# Patient Record
Sex: MALE | Race: WHITE
[De-identification: names, ages, dates, MRNs, and addresses within clinical notes are randomized per-mention and may not be internally consistent; named-entity substitution may affect disease eponyms.]

---

## 2018-05-26 ENCOUNTER — HOSPITAL ENCOUNTER (EMERGENCY)
Dept: HOSPITAL 80 - FED | Age: 53
Discharge: HOME | End: 2018-05-26
Payer: COMMERCIAL

## 2018-05-26 VITALS — SYSTOLIC BLOOD PRESSURE: 129 MMHG | DIASTOLIC BLOOD PRESSURE: 91 MMHG
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
## 2018-05-26 DIAGNOSIS — S29.011A: Primary | ICD-10-CM

## 2018-05-26 DIAGNOSIS — R09.1: ICD-10-CM

## 2018-05-26 DIAGNOSIS — E86.9: ICD-10-CM
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
## 2018-05-26 LAB
CK SERPL-CCNC: 118 IU/L (ref 0–224)
INR PPP: 0.97 (ref 0.83–1.16)
PLATELET # BLD: 198 10^3/UL (ref 150–400)
PROTHROMBIN TIME: 13.1 SEC (ref 12–15)

## 2018-05-26 NOTE — CPEKG
Heart Rate: 82

RR Interval: 732

P-R Interval: 188

QRSD Interval: 104

QT Interval: 388

QTC Interval: 453

P Axis: 27

QRS Axis: 56

T Wave Axis: 6

EKG Severity - NORMAL ECG -

EKG Impression: SINUS RHYTHM

Electronically Signed By: Aide Estrada 26-May-2018 15:02:17

## 2018-05-26 NOTE — EDPHY
H & P


Stated Complaint: "felt vibration in heart"


Source: Patient, EMS





- Personal History


Current Tetanus/Diphtheria Vaccine: Unsure


Current Tetanus Diphtheria and Acellular Pertussis (TDAP): Unsure





- Medical/Surgical History


Hx Asthma: No


Hx Chronic Respiratory Disease: No


Hx Diabetes: No


Hx Cardiac Disease: No


Hx Renal Disease: No


Hx Cirrhosis: No


Hx Alcoholism: No


Hx HIV/AIDS: No


Hx Splenectomy or Spleen Trauma: No


Other PMH: kidney stone





- Social History


Smoking Status: Never smoked


Time Seen by Provider: 05/26/18 03:47


HPI/ROS: 





HPI





CHIEF COMPLAINT:  Vibrating and chest and chest pressure





HISTORY OF PRESENT ILLNESS:  This patient very pleasant 52-year-old male he is 

otherwise healthy has a history of kidney stones he presents emergency room by 

EMS after he states that he was "dreaming and felt vibration in his chest"  

patient states that he initially thought it was a dream and then he woke up and 

felt vibrating in his chest.  He asked his wife to check out his chest and 

listen for the vibrating his wife put her ear to his chest and heard vibration.

  This concerned them he states that he developed some pressure in his chest 

especially when he goes to lay down.  When he lays flat he feels like he cannot 

take a deep breath in.


This started at 2:00 a.m. It is now close to 4:00 a.m. In the emergency room.  

He called 911 after talking to a nurse hotline for discomfort in his chest.


He reports that he cleaned out his garage today moved Dilaudid boxes and was 

sweeping out dirt and debris from his garage.  Does complain of shortness of 

breath when he goes to lay flat.  He denies any pleuritic pain.





He became concerned with the sensation of something vibrating in his chest.  

His wife heard the vibration as well and this prompted a 911 call.





Upon arrival to the emergency room the patient is resting comfortably he denies 

any further vibrating in his chest.  He does report when he goes to lay flat 

here in the ER that he gets short of breath can't take a deep breath in.





Patient was given full-dose aspirin by EMS.





He denies any hemoptysis or pleuritic pain.  Denies fever.  Denies productive 

cough.  Denies focal numbness or tingling.








Past Medical History:  Denies medical history except for kidney stones





Past Surgical History:  Denies significant surgical history





Social History:  Denies daily use of drugs alcohol tobacco.





Family History:  Denies any cardiac disease in his family








ROS   


REVIEW OF SYSTEMS:


A comprehensive 10 point review of systems is otherwise negative aside from 

elements mentioned in the history of present illness.








Exam   


Constitutional  appears well nontoxic no acute distress, vital signs stable at 

triage, triage nursing summary reviewed, vital signs reviewed, awake/alert. 


Eyes   normal conjunctivae and sclera, EOMI, PERRLA. 


HENT   normal inspection, atraumatic, moist mucus membranes, no epistaxis, neck 

supple/ no meningismus, no raccoon eyes. 


Respiratory   clear to auscultation bilaterally, normal breath sounds, no 

respiratory distress, no wheezing. 


Cardiovascular   rate normal, regular rhythm, no murmur, no edema, distal 

pulses normal. 


Gastrointestinal   soft, non-tender, no rebound, no guarding, normal bowel 

sounds, no distension, no pulsatile mass. 


Genitourinary   no CVA tenderness. 


Musculoskeletal  no midline vertebral tenderness, full range of motion, no calf 

swelling, no tenderness of extremities, no meningismus, good pulses, 

neurovascularly intact.


Skin   pink, warm, & dry, no rash, skin atraumatic. 


Neurologic   awake, alert and oriented x 3, AAOx3, moves all 4 extremities 

equally, motor intact, sensory intact, CN II-XII intact, normal cerebellar, 

normal vision, normal speech. 


Psychiatric   normal mood/affect. 


Heme/Lymph/Immune   no lymphadenopathy.





Differential diagnosis includes but is not limited to:  ACS, atypical chest pain

, pneumothorax, pneumonia, pulmonary embolism, aortic dissection, congestive 

heart failure, tumor, musculoskeletal pain, esophageal pain, GERD, peptic ulcer 

disease, pancreatitis





Medical Decision Making:  Plan for this patient IV establishment with EKG, 

check troponin, full cardiac monitor, chest x-ray two view, D-dimer, gentle IV 

hydration and re-evaluate.





Re-evaluation:








EKG interpretation by me on record in [facility] system.  Impression time of 

EKG 3:40 a.m., this is sinus rhythm rate of 82 there is no acute ischemia no ST 

elevation no ST depression no significant T-wave abnormalities.  No signs of 

cardiac arrhythmia.  Unremarkable EKG.








0618:  Further discussion with the patient he states when he breathes in with 

deep inspiration he has some chest discomfort.  Describes as sharp across his 

chest.  It is reproducible every time he takes deep breath in or goes to lay 

flat.





His blood work has been reassuring and reviewed.  He has a negative troponin 

nonischemic EKG.








After further discussion with the patient he reports that he hung the tire rack 

in his garage for the past 2 days worked on applying himself yesterday caring 

multiple 40 lb tires and placing them on the rack above his garage.  He had to 

climb a ladder to do this.  He additionally states that he placed spare tires 

as well.  This was a lot of vigorous activity with him.  He did not feel any 

chest pain or shortness at that time.  However tonight he woke up with this 

discomfort.  When I palpate his chest he does not have any significant 

tenderness or crepitus.





However given that his troponin is normal on EKG is nonischemic negative D-dimer

, chest x-ray is unremarkable without any acute cardiopulmonary disease.





He still having pleuritic pain reproducible over time takes deep breath in.  We 

discussed risk versus benefit about CT angiogram of his chest.  He would like 

to do the CT angiogram of chest to make sure there is not any evidence of 

significant chest wall injury inflammation or PE.





Additionally I will order him IV Toradol to see if this improves his discomfort.





If the Toradol improved his pain in the CT scan is negative I feel like he can 

go home.


I will plan on repeat EKG repeat troponin at 7:00 a.m..  If these are normal 

and he feels better I believe he can go home.








Most likely cause of chest pain is musculoskeletal nature.





CT angiogram of the chest for rule out pulmonary embolism are normal thorax or 

focal pneumonia is negative.  No PE no pneumonia no pneumothorax.  Called to me 

by Dr. Leon.





0653:  Patient resting comfortably.  Dr. Aide Estrada follow-up repeat troponin.





If repeat troponin is negative patient be discharged home.





This is unlikely to be acute coronary syndrome with 2-troponins and 2 normal 

EKGs.





CT angiogram of the chest shows no PE.





Most likely this is musculoskeletal chest wall pain from extensive use of 

moving tires.





Return precautions discussed with the patient.





EKG interpretation by me on record in [facility] system.  Impression this is a 

repeat EKG time of repeat EKG 6:50 a.m. This is sinus rhythm rate of 71 this is 

unchanged from the previous EKG with no acute ischemia.  No ST elevation no ST 

depression.  Very similar morphology to the previous EKG.





Repeat troponin is pending.  Dr. Aide Estrada follow up repeat troponin and 

reassess patient. (Rubio Wills)


Constitutional: 


 Initial Vital Signs











Temperature (C)  36.7 C   05/26/18 03:37


 


Heart Rate  77   05/26/18 03:37


 


Respiratory Rate  16   05/26/18 03:37


 


Blood Pressure  138/102 H  05/26/18 03:37


 


O2 Sat (%)  92   05/26/18 03:37








 











O2 Delivery Mode               Room Air


 


O2 (L/minute)                  2














Allergies/Adverse Reactions: 


 





No Known Allergies Allergy (Unverified 05/26/18 04:18)


 








Home Medications: 














 Medication  Instructions  Recorded


 


Ibuprofen [Motrin (*)] 800 mg PO Q6-8PRN #10 tab 05/26/18














Medical Decision Making


ED Course/Re-evaluation: 





Repeat troponin is normal.  I reviewed the EKGs and both EKGs are normal.  

Heart Score is 1 (for age).  HPI reviewed with pt; suggests musculoskeletal 

etiology.  I feel this patient is a safe and stable for discharge home.  He 

will follow up with Cardiology as an outpatient.  Chest pain precautions given.


 (Aide Estrada)


Differential Diagnosis: 





Differential diagnosis includes though it is not limited to pneumonia, 

pneumothorax, pulmonary embolism, aortic dissection, pericarditis, acute 

coronary syndrome. (Aide Estrada)





- Data Points


Laboratory Results: 


 Laboratory Results





 05/26/18 03:45 





 05/26/18 03:45 





 











  05/26/18 05/26/18 05/26/18





  06:58 03:45 03:45


 


WBC      





    


 


RBC      





    


 


Hgb      





    


 


Hct      





    


 


MCV      





    


 


MCH      





    


 


MCHC      





    


 


RDW      





    


 


Plt Count      





    


 


MPV      





    


 


Neut % (Auto)      





    


 


Lymph % (Auto)      





    


 


Mono % (Auto)      





    


 


Eos % (Auto)      





    


 


Baso % (Auto)      





    


 


Nucleat RBC Rel Count      





    


 


Absolute Neuts (auto)      





    


 


Absolute Lymphs (auto)      





    


 


Absolute Monos (auto)      





    


 


Absolute Eos (auto)      





    


 


Absolute Basos (auto)      





    


 


Absolute Nucleated RBC      





    


 


Immature Gran %      





    


 


Immature Gran #      





    


 


PT      13.1 SEC SEC





     (12.0-15.0) 


 


INR      0.97 





     (0.83-1.16) 


 


APTT      30.7 SEC SEC





     (23.0-38.0) 


 


D-Dimer      < 0.27 ug/mLFEU ug/mLFEU





     (0.00-0.50) 


 


Sodium    141 mEq/L mEq/L  





    (135-145)  


 


Potassium    3.8 mEq/L mEq/L  





    (3.3-5.0)  


 


Chloride    96 mEq/L L mEq/L  





    ()  


 


Carbon Dioxide    31 mEq/l mEq/l  





    (22-31)  


 


Anion Gap    14 mEq/L mEq/L  





    (8-16)  


 


BUN    18 mg/dL mg/dL  





    (7-23)  


 


Creatinine    1.1 mg/dL mg/dL  





    (0.7-1.3)  


 


Estimated GFR    > 60   





    


 


Glucose    101 mg/dL H mg/dL  





    ()  


 


Calcium    9.1 mg/dL mg/dL  





    (8.5-10.4)  


 


Magnesium    2.0 mg/dL mg/dL  





    (1.6-2.3)  


 


Total Bilirubin    1.4 mg/dL mg/dL  





    (0.1-1.4)  


 


Conjugated Bilirubin    0.4 mg/dL mg/dL  





    (0.0-0.5)  


 


Unconjugated Bilirubin    1.0 mg/dL mg/dL  





    (0.0-1.1)  


 


AST    30 IU/L IU/L  





    (17-59)  


 


ALT    42 IU/L IU/L  





    (21-72)  


 


Alkaline Phosphatase    47 IU/L IU/L  





    ()  


 


Creatine Kinase    118 IU/L IU/L  





    (0-224)  


 


CK-MB (CK-2) Fraction    1.33 ng/mL ng/mL  





    (0.00-3.19)  


 


Troponin I  < 0.012 ng/mL ng/mL  < 0.012 ng/mL ng/mL  





   (0.000-0.034)   (0.000-0.034)  


 


NT-Pro-B Natriuret Pep    24 pg/mL pg/mL  





    (0-125)  


 


Total Protein    7.6 g/dL g/dL  





    (6.3-8.2)  


 


Albumin    4.4 g/dL g/dL  





    (3.5-5.0)  


 


Lipase    103 IU/L IU/L  





    ()  














  05/26/18





  03:45


 


WBC  6.50 10^3/uL 10^3/uL





   (3.80-9.50) 


 


RBC  5.49 10^6/uL 10^6/uL





   (4.40-6.38) 


 


Hgb  16.4 g/dL g/dL





   (13.7-17.5) 


 


Hct  47.2 % %





   (40.0-51.0) 


 


MCV  86.0 fL fL





   (81.5-99.8) 


 


MCH  29.9 pg pg





   (27.9-34.1) 


 


MCHC  34.7 g/dL g/dL





   (32.4-36.7) 


 


RDW  12.6 % %





   (11.5-15.2) 


 


Plt Count  198 10^3/uL 10^3/uL





   (150-400) 


 


MPV  10.3 fL fL





   (8.7-11.7) 


 


Neut % (Auto)  50.1 % %





   (39.3-74.2) 


 


Lymph % (Auto)  37.8 % %





   (15.0-45.0) 


 


Mono % (Auto)  9.2 % %





   (4.5-13.0) 


 


Eos % (Auto)  2.2 % %





   (0.6-7.6) 


 


Baso % (Auto)  0.5 % %





   (0.3-1.7) 


 


Nucleat RBC Rel Count  0.0 % %





   (0.0-0.2) 


 


Absolute Neuts (auto)  3.26 10^3/uL 10^3/uL





   (1.70-6.50) 


 


Absolute Lymphs (auto)  2.46 10^3/uL 10^3/uL





   (1.00-3.00) 


 


Absolute Monos (auto)  0.60 10^3/uL 10^3/uL





   (0.30-0.80) 


 


Absolute Eos (auto)  0.14 10^3/uL 10^3/uL





   (0.03-0.40) 


 


Absolute Basos (auto)  0.03 10^3/uL 10^3/uL





   (0.02-0.10) 


 


Absolute Nucleated RBC  0.00 10^3/uL 10^3/uL





   (0-0.01) 


 


Immature Gran %  0.2 % %





   (0.0-1.1) 


 


Immature Gran #  0.01 10^3/uL 10^3/uL





   (0.00-0.10) 


 


PT  





  


 


INR  





  


 


APTT  





  


 


D-Dimer  





  


 


Sodium  





  


 


Potassium  





  


 


Chloride  





  


 


Carbon Dioxide  





  


 


Anion Gap  





  


 


BUN  





  


 


Creatinine  





  


 


Estimated GFR  





  


 


Glucose  





  


 


Calcium  





  


 


Magnesium  





  


 


Total Bilirubin  





  


 


Conjugated Bilirubin  





  


 


Unconjugated Bilirubin  





  


 


AST  





  


 


ALT  





  


 


Alkaline Phosphatase  





  


 


Creatine Kinase  





  


 


CK-MB (CK-2) Fraction  





  


 


Troponin I  





  


 


NT-Pro-B Natriuret Pep  





  


 


Total Protein  





  


 


Albumin  





  


 


Lipase  





  











Medications Given: 


 








Discontinued Medications





Sodium Chloride (Ns)  1,000 mls @ 0 mls/hr IV EDNOW ONE; Wide Open


   PRN Reason: Protocol


   Stop: 05/26/18 03:47


   Last Admin: 05/26/18 04:18 Dose:  1,000 mls


Ketorolac Tromethamine (Toradol)  15 mg IVP ONCE ONE


   Stop: 05/26/18 06:18


   Last Admin: 05/26/18 06:26 Dose:  15 mg








Departure





- Departure


Disposition: Home, Routine, Self-Care


Clinical Impression: 


 Pleurisy





Chest wall muscle strain


Qualifiers:


 Encounter type: initial encounter Qualified Code(s): S29.011A - Strain of 

muscle and tendon of front wall of thorax, initial encounter





Condition: Good


Instructions:  Pleurisy (ED), Chest Wall Pain (ED)


Additional Instructions: 


1. Based upon the testing done in the Emergency Department today we see no 

evidence of a heart attack.


2. We are unable to fully exclude coronary artery disease based upon the 

testing available in the Emergency Department.


3. For this reason, we would like you to be seen by cardiology for 

consideration of additional testing within the next week.


4. Please contact the cardiologist you have been referred to schedule this 

appointment. Their offices are typically open from 8:30am-5pm M-F. 


5. Please return to the Emergency Department immediately for any recurrent 

chest pain, difficulty breathing or other concerns.





Referrals: 


Nathan Yadav MD [Medical Doctor] - As per Instructions


Prescriptions: 


Ibuprofen [Motrin (*)] 800 mg PO Q6-8PRN #10 tab

## 2018-05-26 NOTE — CPEKG
Heart Rate: 71

RR Interval: 845

P-R Interval: 188

QRSD Interval: 102

QT Interval: 400

QTC Interval: 435

P Axis: 18

QRS Axis: 49

T Wave Axis: 12

EKG Severity - NORMAL ECG -

EKG Impression: SINUS RHYTHM

Electronically Signed By: Aide Estrada 26-May-2018 15:02:09